# Patient Record
Sex: MALE | Race: BLACK OR AFRICAN AMERICAN | NOT HISPANIC OR LATINO | Employment: FULL TIME | URBAN - METROPOLITAN AREA
[De-identification: names, ages, dates, MRNs, and addresses within clinical notes are randomized per-mention and may not be internally consistent; named-entity substitution may affect disease eponyms.]

---

## 2019-08-27 ENCOUNTER — HOSPITAL ENCOUNTER (OUTPATIENT)
Dept: GENERAL RADIOLOGY | Facility: HOSPITAL | Age: 59
Discharge: HOME | End: 2019-08-27
Admitting: FAMILY MEDICINE

## 2019-08-27 ENCOUNTER — OFFICE VISIT (OUTPATIENT)
Dept: FAMILY MEDICINE CLINIC | Facility: CLINIC | Age: 59
End: 2019-08-27

## 2019-08-27 VITALS
WEIGHT: 211 LBS | SYSTOLIC BLOOD PRESSURE: 122 MMHG | DIASTOLIC BLOOD PRESSURE: 80 MMHG | HEART RATE: 75 BPM | BODY MASS INDEX: 26.24 KG/M2 | OXYGEN SATURATION: 98 % | HEIGHT: 75 IN

## 2019-08-27 DIAGNOSIS — R68.82 LOW LIBIDO: ICD-10-CM

## 2019-08-27 DIAGNOSIS — R33.9 URINARY RETENTION: ICD-10-CM

## 2019-08-27 DIAGNOSIS — N52.9 ERECTILE DYSFUNCTION, UNSPECIFIED ERECTILE DYSFUNCTION TYPE: ICD-10-CM

## 2019-08-27 DIAGNOSIS — M54.50 MIDLINE LOW BACK PAIN WITHOUT SCIATICA, UNSPECIFIED CHRONICITY: ICD-10-CM

## 2019-08-27 DIAGNOSIS — N41.0 ACUTE PROSTATITIS: Primary | ICD-10-CM

## 2019-08-27 PROCEDURE — 99203 OFFICE O/P NEW LOW 30 MIN: CPT | Performed by: FAMILY MEDICINE

## 2019-08-27 PROCEDURE — 72100 X-RAY EXAM L-S SPINE 2/3 VWS: CPT

## 2019-08-28 ENCOUNTER — LAB (OUTPATIENT)
Dept: LAB | Facility: HOSPITAL | Age: 59
End: 2019-08-28

## 2019-08-28 DIAGNOSIS — R68.82 LOW LIBIDO: ICD-10-CM

## 2019-08-28 DIAGNOSIS — R33.9 URINARY RETENTION: ICD-10-CM

## 2019-08-28 DIAGNOSIS — N41.0 ACUTE PROSTATITIS: ICD-10-CM

## 2019-08-28 DIAGNOSIS — N52.9 ERECTILE DYSFUNCTION, UNSPECIFIED ERECTILE DYSFUNCTION TYPE: ICD-10-CM

## 2019-08-28 LAB
ALBUMIN SERPL-MCNC: 4.4 G/DL (ref 3.5–5.2)
ALBUMIN/GLOB SERPL: 1.6 G/DL
ALP SERPL-CCNC: 73 U/L (ref 39–117)
ALT SERPL W P-5'-P-CCNC: 9 U/L (ref 1–41)
ANION GAP SERPL CALCULATED.3IONS-SCNC: 9.9 MMOL/L (ref 5–15)
AST SERPL-CCNC: 14 U/L (ref 1–40)
BASOPHILS # BLD AUTO: 0.05 10*3/MM3 (ref 0–0.2)
BASOPHILS NFR BLD AUTO: 0.9 % (ref 0–1.5)
BILIRUB SERPL-MCNC: 0.6 MG/DL (ref 0.2–1.2)
BILIRUB UR QL STRIP: NEGATIVE
BUN BLD-MCNC: 12 MG/DL (ref 6–20)
BUN/CREAT SERPL: 9.8 (ref 7–25)
CALCIUM SPEC-SCNC: 9.5 MG/DL (ref 8.6–10.5)
CHLORIDE SERPL-SCNC: 102 MMOL/L (ref 98–107)
CHOLEST SERPL-MCNC: 213 MG/DL (ref 0–200)
CLARITY UR: CLEAR
CO2 SERPL-SCNC: 27.1 MMOL/L (ref 22–29)
COLOR UR: YELLOW
CREAT BLD-MCNC: 1.22 MG/DL (ref 0.76–1.27)
DEPRECATED RDW RBC AUTO: 50 FL (ref 37–54)
EOSINOPHIL # BLD AUTO: 0.6 10*3/MM3 (ref 0–0.4)
EOSINOPHIL NFR BLD AUTO: 10.3 % (ref 0.3–6.2)
ERYTHROCYTE [DISTWIDTH] IN BLOOD BY AUTOMATED COUNT: 13.6 % (ref 12.3–15.4)
GFR SERPL CREATININE-BSD FRML MDRD: 74 ML/MIN/1.73
GLOBULIN UR ELPH-MCNC: 2.7 GM/DL
GLUCOSE BLD-MCNC: 103 MG/DL (ref 65–99)
GLUCOSE UR STRIP-MCNC: NEGATIVE MG/DL
HBA1C MFR BLD: 5.98 % (ref 4.8–5.6)
HCT VFR BLD AUTO: 44.5 % (ref 37.5–51)
HDLC SERPL-MCNC: 50 MG/DL (ref 40–60)
HGB BLD-MCNC: 14 G/DL (ref 13–17.7)
HGB UR QL STRIP.AUTO: NEGATIVE
IMM GRANULOCYTES # BLD AUTO: 0.02 10*3/MM3 (ref 0–0.05)
IMM GRANULOCYTES NFR BLD AUTO: 0.3 % (ref 0–0.5)
KETONES UR QL STRIP: NEGATIVE
LDLC SERPL CALC-MCNC: 129 MG/DL (ref 0–100)
LDLC/HDLC SERPL: 2.58 {RATIO}
LEUKOCYTE ESTERASE UR QL STRIP.AUTO: NEGATIVE
LYMPHOCYTES # BLD AUTO: 2.08 10*3/MM3 (ref 0.7–3.1)
LYMPHOCYTES NFR BLD AUTO: 35.8 % (ref 19.6–45.3)
MCH RBC QN AUTO: 31 PG (ref 26.6–33)
MCHC RBC AUTO-ENTMCNC: 31.5 G/DL (ref 31.5–35.7)
MCV RBC AUTO: 98.7 FL (ref 79–97)
MONOCYTES # BLD AUTO: 0.48 10*3/MM3 (ref 0.1–0.9)
MONOCYTES NFR BLD AUTO: 8.3 % (ref 5–12)
NEUTROPHILS # BLD AUTO: 2.58 10*3/MM3 (ref 1.7–7)
NEUTROPHILS NFR BLD AUTO: 44.4 % (ref 42.7–76)
NITRITE UR QL STRIP: NEGATIVE
NRBC BLD AUTO-RTO: 0 /100 WBC (ref 0–0.2)
PH UR STRIP.AUTO: 6.5 [PH] (ref 5–8)
PLATELET # BLD AUTO: 325 10*3/MM3 (ref 140–450)
PMV BLD AUTO: 11.5 FL (ref 6–12)
POTASSIUM BLD-SCNC: 4.7 MMOL/L (ref 3.5–5.2)
PROT SERPL-MCNC: 7.1 G/DL (ref 6–8.5)
PROT UR QL STRIP: NEGATIVE
PSA SERPL-MCNC: 1.89 NG/ML (ref 0–4)
RBC # BLD AUTO: 4.51 10*6/MM3 (ref 4.14–5.8)
SODIUM BLD-SCNC: 139 MMOL/L (ref 136–145)
SP GR UR STRIP: 1.02 (ref 1–1.03)
TESTOST SERPL-MCNC: 523 NG/DL (ref 193–740)
TRIGL SERPL-MCNC: 169 MG/DL (ref 0–150)
TSH SERPL DL<=0.05 MIU/L-ACNC: 2.5 UIU/ML (ref 0.27–4.2)
UROBILINOGEN UR QL STRIP: NORMAL
VLDLC SERPL-MCNC: 33.8 MG/DL (ref 5–40)
WBC NRBC COR # BLD: 5.81 10*3/MM3 (ref 3.4–10.8)

## 2019-08-28 PROCEDURE — 85025 COMPLETE CBC W/AUTO DIFF WBC: CPT

## 2019-08-28 PROCEDURE — 84153 ASSAY OF PSA TOTAL: CPT

## 2019-08-28 PROCEDURE — 81003 URINALYSIS AUTO W/O SCOPE: CPT

## 2019-08-28 PROCEDURE — 84443 ASSAY THYROID STIM HORMONE: CPT

## 2019-08-28 PROCEDURE — 80053 COMPREHEN METABOLIC PANEL: CPT

## 2019-08-28 PROCEDURE — 83036 HEMOGLOBIN GLYCOSYLATED A1C: CPT

## 2019-08-28 PROCEDURE — 84403 ASSAY OF TOTAL TESTOSTERONE: CPT

## 2019-08-28 PROCEDURE — 80061 LIPID PANEL: CPT

## 2019-09-04 ENCOUNTER — TELEPHONE (OUTPATIENT)
Dept: FAMILY MEDICINE CLINIC | Facility: CLINIC | Age: 59
End: 2019-09-04

## 2019-09-04 RX ORDER — SULFAMETHOXAZOLE AND TRIMETHOPRIM 800; 160 MG/1; MG/1
1 TABLET ORAL 2 TIMES DAILY
Qty: 14 TABLET | Refills: 0 | Status: SHIPPED | OUTPATIENT
Start: 2019-09-04 | End: 2019-09-11

## 2019-09-23 ENCOUNTER — OFFICE VISIT (OUTPATIENT)
Dept: FAMILY MEDICINE CLINIC | Facility: CLINIC | Age: 59
End: 2019-09-23

## 2019-09-23 VITALS
HEART RATE: 60 BPM | WEIGHT: 218 LBS | BODY MASS INDEX: 27.1 KG/M2 | DIASTOLIC BLOOD PRESSURE: 80 MMHG | OXYGEN SATURATION: 99 % | HEIGHT: 75 IN | SYSTOLIC BLOOD PRESSURE: 130 MMHG

## 2019-09-23 DIAGNOSIS — R73.03 PREDIABETES: Primary | ICD-10-CM

## 2019-09-23 DIAGNOSIS — Z87.891 PERSONAL HISTORY OF TOBACCO USE, PRESENTING HAZARDS TO HEALTH: ICD-10-CM

## 2019-09-23 DIAGNOSIS — K42.9 UMBILICAL HERNIA WITHOUT OBSTRUCTION AND WITHOUT GANGRENE: ICD-10-CM

## 2019-09-23 DIAGNOSIS — E78.5 HYPERLIPIDEMIA, UNSPECIFIED HYPERLIPIDEMIA TYPE: ICD-10-CM

## 2019-09-23 DIAGNOSIS — R33.9 URINARY RETENTION: ICD-10-CM

## 2019-09-23 PROCEDURE — 99214 OFFICE O/P EST MOD 30 MIN: CPT | Performed by: FAMILY MEDICINE

## 2019-09-23 RX ORDER — BUPROPION HYDROCHLORIDE 150 MG/1
150 TABLET, EXTENDED RELEASE ORAL 2 TIMES DAILY
Qty: 60 TABLET | Refills: 2 | Status: SHIPPED | OUTPATIENT
Start: 2019-09-23 | End: 2021-01-20

## 2019-09-23 RX ORDER — ROSUVASTATIN CALCIUM 5 MG/1
5 TABLET, COATED ORAL DAILY
Qty: 90 TABLET | Refills: 3 | Status: SHIPPED | OUTPATIENT
Start: 2019-09-23 | End: 2021-01-20 | Stop reason: SDUPTHER

## 2019-09-23 RX ORDER — TAMSULOSIN HYDROCHLORIDE 0.4 MG/1
1 CAPSULE ORAL NIGHTLY
Qty: 30 CAPSULE | Refills: 2 | Status: SHIPPED | OUTPATIENT
Start: 2019-09-23 | End: 2021-01-20

## 2019-09-23 NOTE — PROGRESS NOTES
Subjective   Phillip Fulton is a 59 y.o. male.     Chief Complaint   Patient presents with   • Follow-up       History of Present Illness     Phillip Fulton presents today for   Chief Complaint   Patient presents with   • Follow-up     He is here today to follow-up on his prediabetes and hyperlipidemia.  He currently takes no medication for his cholesterol.  He denies any symptoms from these.  He has a history of tobacco use but has been able to quit on his own.  At his last visit he was having some urinary retention and nocturia, has been referred to urology but will not see them for a little while.  He also complains of a bulge around his umbilicus.    This patient is accompanied by their wife who contributes to the history of their care.    The following portions of the patient's history were reviewed and updated as appropriate: allergies, current medications, past family history, past medical history, past social history, past surgical history and problem list.    Active Ambulatory Problems     Diagnosis Date Noted   • Hyperlipidemia 09/23/2019   • Prediabetes 09/23/2019     Resolved Ambulatory Problems     Diagnosis Date Noted   • No Resolved Ambulatory Problems     Past Medical History:   Diagnosis Date   • Hyperlipidemia      Past Surgical History:   Procedure Laterality Date   • COLONOSCOPY       Family History   Problem Relation Age of Onset   • Hypertension Mother    • Cancer Father    • Cancer Maternal Grandmother    • Cancer Paternal Grandmother      Social History     Socioeconomic History   • Marital status: Single     Spouse name: Not on file   • Number of children: Not on file   • Years of education: Not on file   • Highest education level: Not on file   Tobacco Use   • Smoking status: Current Every Day Smoker     Packs/day: 0.25     Types: Cigarettes   • Smokeless tobacco: Never Used   Substance and Sexual Activity   • Alcohol use: Yes     Comment: 4-5 drinks weekly    • Drug use: Yes     Types:  "Cocaine         Review of Systems   Constitutional: Negative.    Respiratory: Negative.    Cardiovascular: Negative.        Objective   Blood pressure 130/80, pulse 60, height 190.5 cm (75\"), weight 98.9 kg (218 lb), SpO2 99 %.  Nursing note reviewed  Physical Exam  Const: NAD, A&Ox4, Pleasant, Cooperative  Eyes: EOMI, no conjunctivitis  ENT: No nasal discharge present, neck supple  Cardiac: Regular rate and rhythm, no cyanosis  Resp: Respiratory rate within normal limits, no increased work of breathing, no audible wheezing or retractions noted  GI: No distention or ascites.  There is a rather large superior umbilical hernia  MSK: Motor and sensation grossly intact in bilateral upper extremities  Neurologic: CN II-XII grossly intact  Psych: Appropriate mood and behavior.  Procedures  Assessment/Plan     Problem List Items Addressed This Visit        Cardiovascular and Mediastinum    Hyperlipidemia    Relevant Medications    rosuvastatin (CRESTOR) 5 MG tablet    Other Relevant Orders    Ambulatory Referral to Nutrition Services       Other    Prediabetes - Primary    Relevant Orders    Ambulatory Referral to Nutrition Services      Other Visit Diagnoses     Urinary retention        Relevant Medications    tamsulosin (FLOMAX) 0.4 MG capsule 24 hr capsule    Personal history of tobacco use, presenting hazards to health        Relevant Medications    buPROPion SR (WELLBUTRIN SR) 150 MG 12 hr tablet    Umbilical hernia without obstruction and without gangrene        Relevant Orders    US Abdomen Limited        #1 HLD  Start rosuvastatin 5 mg daily  Would also like him to see a dietitian    #2 prediabetes  This is a new diagnosis, he will speak to the dietitian and work on his diet    #3 urinary retention  He should see the urologist, but in the meantime I would like him to start on tamsulosin 0.4 mg daily   part of his issue is that the urologist Is in [place] and they have a difficult time with transportation and " getting off of work    #4 umbilical hernia  Recommend an ultrasound of the abdomen    #5 history of tobacco use  Tobacco/Smoking Cessation:  It is very important that he quit smoking. There are various alternatives available to help with this difficult task, but first and foremost, he must make a firm commitment and decision to quit. The nature of nicotine addiction is discussed. The usefulness of behavioral therapy is discussed and suggested. The correct use, cost and side effects of nicotine replacement therapy such as gum or patches is discussed.  The use of vaporizers and there relatively as of yet unknown long-term health effects are discussed.  Bupropion and Chantix and their costs (sometimes not covered fully by insurance) as well as side effects are reviewed. The quit rates are discussed. I recommend he not allow potential costs of treatment to deter him from using nicotine replacement therapy or pharmacologic aids, as the long term economic and health benefits are obvious.    Patient-rated motivation to quit smokin  Patient-rated confidence in ability to quit smokin   Plan: Patient was counseled on the risks of tobacco abuse and benefits of quitting. Patient is interested in quitting at this time. We discussed nicotine replacement versus Bupropion or Chantix. There were given information about [id number]. They were given a prescription for Bupropion. They were advised to avoid secondhand smoke, set a smoking quit date, and given handouts pertinent for tobacco cessation. Over 10 minutes was spent on counseling the patient.  Smoking cessation counseling was provided.  Pharmacotherapy was prescribed as ordered.        Patient Instructions   1.  Engage in aerobic physical activity to lower blood pressure and reduce LDL and non-HDL cholesterol.  I would advise at least 3-4 sessions per week, lasting on average 40 minutes per session, involving moderate to vigorous intensity physical activity.    2.   For the benefit of both blood pressure and LDL lowering, consume a diet emphasizing the intake of vegetables, fruits, and whole grains.  It should include low-fat dairy products, poultry, fish, legumes, nontropical vegetable oils, and nuts.  Limit your intake of sweets, sugar sweetened beverages, and red meats.    3.  Any healthy diet needs to be adapted to appropriate calorie requirements as well as personal preferences.  A diet is only as good as your adherence to it.  Aim for a calorie intake of about 4496-8448 manuel per day.    4.  Call in 2 weeks regarding status of prostate.    5.  Work on quitting smoking. The Zyban (wellbutrin, bupropion) will help with cravings.      Return in about 3 months (around 12/23/2019) for with A1c;.    Ambulatory progress note signed and attested to by Raji Hernandez D.O.

## 2019-09-23 NOTE — PATIENT INSTRUCTIONS
1.  Engage in aerobic physical activity to lower blood pressure and reduce LDL and non-HDL cholesterol.  I would advise at least 3-4 sessions per week, lasting on average 40 minutes per session, involving moderate to vigorous intensity physical activity.    2.  For the benefit of both blood pressure and LDL lowering, consume a diet emphasizing the intake of vegetables, fruits, and whole grains.  It should include low-fat dairy products, poultry, fish, legumes, nontropical vegetable oils, and nuts.  Limit your intake of sweets, sugar sweetened beverages, and red meats.    3.  Any healthy diet needs to be adapted to appropriate calorie requirements as well as personal preferences.  A diet is only as good as your adherence to it.  Aim for a calorie intake of about 2836-7862 manuel per day.    4.  Call in 2 weeks regarding status of prostate.    5.  Work on quitting smoking. The Zyban (wellbutrin, bupropion) will help with cravings.

## 2020-03-23 ENCOUNTER — HOSPITAL ENCOUNTER (EMERGENCY)
Facility: HOSPITAL | Age: 60
Discharge: LEFT BEFORE DISCHARGE | End: 2020-03-23

## 2020-03-23 PROCEDURE — 99211 OFF/OP EST MAY X REQ PHY/QHP: CPT

## 2020-12-10 ENCOUNTER — OFFICE VISIT (OUTPATIENT)
Dept: ORTHOPEDIC SURGERY | Facility: CLINIC | Age: 60
End: 2020-12-10

## 2020-12-10 VITALS — BODY MASS INDEX: 26.26 KG/M2 | OXYGEN SATURATION: 98 % | HEIGHT: 75 IN | WEIGHT: 211.2 LBS | HEART RATE: 74 BPM

## 2020-12-10 DIAGNOSIS — M25.562 LEFT ANTERIOR KNEE PAIN: ICD-10-CM

## 2020-12-10 DIAGNOSIS — M25.562 LEFT KNEE PAIN, UNSPECIFIED CHRONICITY: Primary | ICD-10-CM

## 2020-12-10 PROCEDURE — 99204 OFFICE O/P NEW MOD 45 MIN: CPT | Performed by: PHYSICIAN ASSISTANT

## 2020-12-10 RX ORDER — MELOXICAM 15 MG/1
15 TABLET ORAL DAILY
Qty: 30 TABLET | Refills: 2 | Status: SHIPPED | OUTPATIENT
Start: 2020-12-10 | End: 2021-01-20 | Stop reason: SDUPTHER

## 2020-12-10 NOTE — PROGRESS NOTES
[facility] Note    Subjective     Patient: Phillip Fulton  : 1960    Primary Care Provider: Raji Hernandez DO    Requesting Provider: As above    Pain of the Left Knee      History    Chief Complaint: Left knee pain    History of Present Illness: This is a very pleasant 60-year-old male presenting to discuss his 2-month history of left knee pain.  He denies any trauma or injury.  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  He complains of pain with standing and weightbearing and walking.  It is worse when getting up from a seated position and stairs.  He rates it to be 7/10 and aching with stiffness.  It is all anterior based pain.  He denies any mechanical symptoms or radiating pain.  He has had no prior treatment.  He is here for further evaluation and treatment recommendations.    Current Outpatient Medications on File Prior to Visit   Medication Sig Dispense Refill   • buPROPion SR (WELLBUTRIN SR) 150 MG 12 hr tablet Take 1 tablet by mouth 2 (Two) Times a Day. 60 tablet 2   • rosuvastatin (CRESTOR) 5 MG tablet Take 1 tablet by mouth Daily. 90 tablet 3   • tamsulosin (FLOMAX) 0.4 MG capsule 24 hr capsule Take 1 capsule by mouth Every Night. 30 capsule 2     No current facility-administered medications on file prior to visit.       No Known Allergies   Past Medical History:   Diagnosis Date   • Hyperlipidemia      Past Surgical History:   Procedure Laterality Date   • COLONOSCOPY       Family History   Problem Relation Age of Onset   • Hypertension Mother    • Cancer Father    • Cancer Maternal Grandmother    • Cancer Paternal Grandmother       Social History     Socioeconomic History   • Marital status: Single     Spouse name: Not on file   • Number of children: Not on file   • Years of education: Not on file   • Highest education level: Not on file   Tobacco Use   • Smoking status: Current Every Day Smoker     Packs/day: 0.25     Types: Cigarettes   • Smokeless  "tobacco: Never Used   Substance and Sexual Activity   • Alcohol use: Yes     Comment: 4-5 drinks weekly    • Drug use: Yes     Types: Cocaine(coke)   • Sexual activity: Defer        Review of Systems   Constitutional: Negative.    HENT: Negative.    Eyes: Negative.    Respiratory: Negative.    Cardiovascular: Negative.    Gastrointestinal: Negative.    Endocrine: Negative.    Genitourinary: Negative.    Musculoskeletal: Positive for arthralgias.   Skin: Negative.    Allergic/Immunologic: Negative.    Neurological: Negative.    Hematological: Negative.    Psychiatric/Behavioral: Negative.        The following portions of the patient's history were reviewed and updated as appropriate: allergies, current medications, past family history, past medical history, past social history, past surgical history and problem list.      Objective      Physical Exam  Pulse 74   Ht 190 cm (74.8\")   Wt 95.8 kg (211 lb 3.2 oz)   SpO2 98%   BMI 26.54 kg/m²     Body mass index is 26.54 kg/m².    GENERAL: Body habitus: normal weight for height    Lower extremity edema: Left: none; Right: none    Varicose veins:  Left: mild; Right: mild    Gait: normal     Mental Status:  awake and alert; oriented to person, place, and time    Voice:  clear  SKIN:  Normal    Hair Growth:  Right:normal; Left:  normal  HEENT: Head: Normocephalic, atraumatic,  without obvious abnormality.  eye: normal external eye, no icterus   PULM:  Repiratory effort normal    Ortho Exam  Peripheral Vascular:    Upper Extremity:   Inspection:  Left--no cyanotic nail beds Right--no cyanotic nail beds   Bilateral:  Pink nail beds with brisk capillary refill   Palpation:  Bilateral radial pulse normal    Musculoskeletal:  Global Assessment:  Overall assessment of Lower Extremity Muscle Strength and Tone:  Left quadriceps--5/5   Left hamstrings--5/5       Left tibialis anterior--5/5  Left gastroc-soleus--5/5  Left EHL--5/5    Right quadriceps--5/5  Right " hamstrings--5/5  Right tibialis anterior--5/5  Right gastroc soleus--5/5  Right EHL--5/5    Lower Extremity:  Knee/Patella:  No digital clubbing or cyanosis.    Examination of left and right knees reveals:  Normal deep tendon reflexes, coordination, strength, tone, sensation.  No known fractures or deformities.    Inspection and Palpation:    Left knee:  Tenderness: Patellofemoral tenderness  Effusion: None  Crepitus:  positive  Pulses:  2+  Ecchymosis:  None  Warmth:  None     Right knee:  Tenderness: None  Effusion: None  Crepitus: Negative   pulses:  2+  Ecchymosis:  None  Warmth:  None     ROM:  Right:  Extension:0    Flexion:135  Left:  Extension:0     Flexion:135    Instability:    Left:  Lachman Test:  Negative, Varus stress test negative, Valgus stress test negative   Anterior Drawer Test:  Negative, Posterior Drawer Test:  Negative    Right:  Lachman Test:  Negative, Varus stress test negative, Valgus stress test negative   Anterior Drawer Test:  Negative, Posterior Drawer Test:  Negative    Deformities/Malalignments/Discrepancies:    Left:  none  Right:  none    Functional Testing:  Right:  Octavio's test:  Negative  Patella grind test:  Negative  Q-angle:  Normal  Apprehension Sign:  Negative      Left:  Octavio's test:  Negative  Patella grind test:  Negative  Q-angle:  Normal  Apprehension Sign:  Negative    SENSATION TO LIGHT TOUCH:  DEEP PERONEAL/SUPERFICIAL PERONEAL/SURAL/SAPHENOUS/TIBIAL:   Left intact; Right intact          Medical Decision Making    Data Review:   ordered and reviewed x-rays today    Assessment:  1. Left knee pain, unspecified chronicity    2. Left anterior knee pain        Plan:  Left anterior knee pain.  I reviewed today's x-rays and clinical findings with the patient.  He is tender over the patellofemoral joint line with mild crepitus.  X-rays today show mild to early moderate medial compartment arthritis with mild patellofemoral degenerative changes.  There are no acute bony  findings.  I think his pain and functional rotations are coming from the patellofemoral joint.  Recommendation today is a round of physical therapy as well as a regular scheduled anti-inflammatory.  I given him a prescription for Mobic.  He will return to see me in 6 to 8 weeks or sooner if needed.    History, diagnosis and treatment plan discussed with Dr. Carlton.        Jovanna Combs PA-C  12/10/20  13:31 EST

## 2021-01-20 ENCOUNTER — OFFICE VISIT (OUTPATIENT)
Dept: FAMILY MEDICINE CLINIC | Facility: CLINIC | Age: 61
End: 2021-01-20

## 2021-01-20 VITALS
OXYGEN SATURATION: 97 % | DIASTOLIC BLOOD PRESSURE: 84 MMHG | BODY MASS INDEX: 26.31 KG/M2 | HEIGHT: 75 IN | SYSTOLIC BLOOD PRESSURE: 130 MMHG | HEART RATE: 67 BPM | WEIGHT: 211.6 LBS

## 2021-01-20 DIAGNOSIS — R20.0 NUMBNESS OF LEFT HAND: ICD-10-CM

## 2021-01-20 DIAGNOSIS — Z87.891 PERSONAL HISTORY OF TOBACCO USE, PRESENTING HAZARDS TO HEALTH: ICD-10-CM

## 2021-01-20 DIAGNOSIS — M79.602 LEFT ARM PAIN: Primary | ICD-10-CM

## 2021-01-20 DIAGNOSIS — N52.9 ERECTILE DYSFUNCTION, UNSPECIFIED ERECTILE DYSFUNCTION TYPE: ICD-10-CM

## 2021-01-20 DIAGNOSIS — R73.03 PREDIABETES: ICD-10-CM

## 2021-01-20 DIAGNOSIS — E78.5 HYPERLIPIDEMIA, UNSPECIFIED HYPERLIPIDEMIA TYPE: ICD-10-CM

## 2021-01-20 LAB — HBA1C MFR BLD: 5.7 %

## 2021-01-20 PROCEDURE — 99214 OFFICE O/P EST MOD 30 MIN: CPT | Performed by: FAMILY MEDICINE

## 2021-01-20 PROCEDURE — 83036 HEMOGLOBIN GLYCOSYLATED A1C: CPT | Performed by: FAMILY MEDICINE

## 2021-01-20 PROCEDURE — 93000 ELECTROCARDIOGRAM COMPLETE: CPT | Performed by: FAMILY MEDICINE

## 2021-01-20 RX ORDER — MELOXICAM 15 MG/1
15 TABLET ORAL DAILY
Qty: 30 TABLET | Refills: 2 | Status: SHIPPED | OUTPATIENT
Start: 2021-01-20 | End: 2021-12-09 | Stop reason: SDUPTHER

## 2021-01-20 RX ORDER — SILDENAFIL 100 MG/1
100 TABLET, FILM COATED ORAL DAILY PRN
Qty: 15 TABLET | Refills: 0 | Status: SHIPPED | OUTPATIENT
Start: 2021-01-20 | End: 2021-03-15 | Stop reason: SDUPTHER

## 2021-01-20 RX ORDER — ROSUVASTATIN CALCIUM 5 MG/1
5 TABLET, COATED ORAL DAILY
Qty: 90 TABLET | Refills: 3 | Status: SHIPPED | OUTPATIENT
Start: 2021-01-20 | End: 2021-12-08 | Stop reason: SDUPTHER

## 2021-01-20 NOTE — PROGRESS NOTES
Subjective   Phillip Fulton is a 60 y.o. male.     Chief Complaint   Patient presents with   • Numbness     pt states that for 2 days his left thumb and index finger are numb and his left arm up to his shoulders hurt.        History of Present Illness     Phillip Fulton presents today for   Chief Complaint   Patient presents with   • Numbness     pt states that for 2 days his left thumb and index finger are numb and his left arm up to his shoulders hurt.      Smoking one black & mild per day.  Stopped taking all his medications including his statin.  He also does not take the tamsulosin regularly.  Left arm pain/numbness in thumb and forefinger started suddenly 2 days ago.  He was at work when it began.  It has continued without any change over the last 2 days.  Nothing aggravates or relieves it.  He tried taking ibuprofen without much improvement.    He has had erectile dysfunction for years, along with slightly decreased libido.  Testosterone check last year was normal.  He has mild prediabetes with an A1c hovering 5.7%.  Otherwise lab work-up has been previously normal.    This patient is accompanied by their self who contributes to the history of their care.    The following portions of the patient's history were reviewed and updated as appropriate: allergies, current medications, past family history, past medical history, past social history, past surgical history and problem list.    Active Ambulatory Problems     Diagnosis Date Noted   • Hyperlipidemia 09/23/2019   • Prediabetes 09/23/2019     Resolved Ambulatory Problems     Diagnosis Date Noted   • No Resolved Ambulatory Problems     No Additional Past Medical History     Past Surgical History:   Procedure Laterality Date   • COLONOSCOPY       Family History   Problem Relation Age of Onset   • Hypertension Mother    • Cancer Father    • Cancer Maternal Grandmother    • Cancer Paternal Grandmother      Social History     Socioeconomic History   • Marital  "status: Single     Spouse name: Not on file   • Number of children: Not on file   • Years of education: Not on file   • Highest education level: Not on file   Tobacco Use   • Smoking status: Current Every Day Smoker     Packs/day: 0.25     Years: 15.00     Pack years: 3.75     Types: Cigars   • Smokeless tobacco: Never Used   Substance and Sexual Activity   • Alcohol use: Yes     Comment: 4-5 drinks weekly    • Drug use: Yes     Types: Cocaine(coke)   • Sexual activity: Defer       Review of Systems  Review of Systems -  General ROS: negative for - chills, fever or night sweats  Cardiovascular ROS: no chest pain or dyspnea on exertion  Gastrointestinal ROS: no abdominal pain, change in bowel habits, or black or bloody stools  Genito-Urinary ROS: no dysuria, trouble voiding, or hematuria    Objective   Blood pressure 130/84, pulse 67, height 190 cm (74.8\"), weight 96 kg (211 lb 9.6 oz), SpO2 97 %.  Nursing note reviewed  Physical Exam  Const: NAD, A&Ox4, Pleasant, Cooperative  Eyes: EOMI, no conjunctivitis  ENT: No nasal discharge present, neck supple  Cardiac: Regular rate and rhythm, no cyanosis  Resp: Respiratory rate within normal limits, no increased work of breathing, no audible wheezing or retractions noted  GI: No distention or ascites  MSK: Slight tenderness over the left posterior scalene and trapezius, pain elicited with right rotation of the cervical spine.  Spurling negative.  Tinel negative at the median and ulnar nerve.    ECG 12 Lead    Date/Time: 1/20/2021 9:49 AM  Performed by: Raji Hernandez DO  Authorized by: Raji Hernandez DO   Comparison: not compared with previous ECG   Previous ECG: no previous ECG available  Rhythm: sinus bradycardia  Rate: bradycardic  Conduction: conduction normal  QRS axis: normal    Clinical impression: normal ECG          Assessment/Plan   Problem List Items Addressed This Visit        Cardiac and Vasculature    Hyperlipidemia    Overview     He is prescribed " rosuvastatin 5 mg daily, but does not take this regularly         Relevant Medications    rosuvastatin (CRESTOR) 5 MG tablet    Other Relevant Orders    Lipid Panel    Comprehensive Metabolic Panel       Endocrine and Metabolic    Prediabetes    Overview     A1c today 5.7%, diet controlled         Relevant Orders    POC Glycosylated Hemoglobin (Hb A1C) (Completed)      Other Visit Diagnoses     Left arm pain    -  Primary    x2 days, stable    Relevant Medications    meloxicam (MOBIC) 15 MG tablet    Other Relevant Orders    ECG 12 Lead    XR Spine Cervical 2 or 3 View    Numbness of left hand        Thumb and forefinger x2 days    Relevant Medications    meloxicam (MOBIC) 15 MG tablet    Other Relevant Orders    ECG 12 Lead    XR Spine Cervical 2 or 3 View    Erectile dysfunction, unspecified erectile dysfunction type        Relevant Medications    sildenafil (Viagra) 100 MG tablet    Other Relevant Orders    Testosterone    Prolactin    TSH Rfx On Abnormal To Free T4    Urinalysis With Microscopic If Indicated (No Culture) - Urine, Clean Catch    Lipid Panel    Comprehensive Metabolic Panel    CBC & Differential    Vitamin D 25 Hydroxy    Vitamin B12    PSA DIAGNOSTIC    Ambulatory Referral to Urology    Personal history of tobacco use, presenting hazards to health              See patient diagnoses and orders along with patient instructions for assessment, plan, and changes to care for patient.    Patient Instructions   1.  For the neck pain/headaches, try some chin-tuck exercises to relax the muscles in your neck that contribute to tension headaches: Sitting up against a wall, looking straight forward and keeping your chin parallel to the floor, slide your head back until the bump on the back touches the wall. Hold for 5 seconds, repeat 3 times, and do a couple sets per day.     I also recommend Tiger Balm (over the counter) applied once daily and as needed, massaged into the muscles of your neck and at the base  of your skull. If those don't help let me know, other options include trigger point injections, acupuncture, physical therapy, and imaging if we need it.    2.  Take meloxicam as prescribed daily    3.  Use Viagra as needed      Return in about 2 weeks (around 2/3/2021) for phone visit, follow up to discuss lab results.    Ambulatory progress note signed and attested to by Raji Hernandez D.O.

## 2021-01-20 NOTE — PATIENT INSTRUCTIONS
1.  For the neck pain/headaches, try some chin-tuck exercises to relax the muscles in your neck that contribute to tension headaches: Sitting up against a wall, looking straight forward and keeping your chin parallel to the floor, slide your head back until the bump on the back touches the wall. Hold for 5 seconds, repeat 3 times, and do a couple sets per day.     I also recommend Tiger Balm (over the counter) applied once daily and as needed, massaged into the muscles of your neck and at the base of your skull. If those don't help let me know, other options include trigger point injections, acupuncture, physical therapy, and imaging if we need it.    2.  Take meloxicam as prescribed daily    3.  Use Viagra as needed

## 2021-01-21 ENCOUNTER — LAB (OUTPATIENT)
Dept: LAB | Facility: HOSPITAL | Age: 61
End: 2021-01-21

## 2021-01-21 ENCOUNTER — HOSPITAL ENCOUNTER (OUTPATIENT)
Dept: GENERAL RADIOLOGY | Facility: HOSPITAL | Age: 61
Discharge: HOME | End: 2021-01-21

## 2021-01-21 DIAGNOSIS — M79.602 LEFT ARM PAIN: ICD-10-CM

## 2021-01-21 DIAGNOSIS — N52.9 ERECTILE DYSFUNCTION, UNSPECIFIED ERECTILE DYSFUNCTION TYPE: ICD-10-CM

## 2021-01-21 DIAGNOSIS — R20.0 NUMBNESS OF LEFT HAND: ICD-10-CM

## 2021-01-21 DIAGNOSIS — E78.5 HYPERLIPIDEMIA, UNSPECIFIED HYPERLIPIDEMIA TYPE: ICD-10-CM

## 2021-01-21 PROCEDURE — 72040 X-RAY EXAM NECK SPINE 2-3 VW: CPT

## 2021-02-03 ENCOUNTER — LAB (OUTPATIENT)
Dept: LAB | Facility: HOSPITAL | Age: 61
End: 2021-02-03

## 2021-02-03 DIAGNOSIS — N52.9 ERECTILE DYSFUNCTION, UNSPECIFIED ERECTILE DYSFUNCTION TYPE: ICD-10-CM

## 2021-02-03 LAB
25(OH)D3 SERPL-MCNC: 7.9 NG/ML (ref 30–100)
ALBUMIN SERPL-MCNC: 4.1 G/DL (ref 3.5–5.2)
ALBUMIN/GLOB SERPL: 1.4 G/DL
ALP SERPL-CCNC: 77 U/L (ref 39–117)
ALT SERPL W P-5'-P-CCNC: 17 U/L (ref 1–41)
ANION GAP SERPL CALCULATED.3IONS-SCNC: 10.2 MMOL/L (ref 5–15)
AST SERPL-CCNC: 20 U/L (ref 1–40)
BASOPHILS # BLD AUTO: 0.07 10*3/MM3 (ref 0–0.2)
BASOPHILS NFR BLD AUTO: 0.9 % (ref 0–1.5)
BILIRUB SERPL-MCNC: 0.4 MG/DL (ref 0–1.2)
BILIRUB UR QL STRIP: NEGATIVE
BUN SERPL-MCNC: 14 MG/DL (ref 6–20)
BUN/CREAT SERPL: 12.3 (ref 7–25)
CALCIUM SPEC-SCNC: 9.7 MG/DL (ref 8.6–10.5)
CHLORIDE SERPL-SCNC: 103 MMOL/L (ref 98–107)
CHOLEST SERPL-MCNC: 198 MG/DL (ref 0–200)
CLARITY UR: CLEAR
CO2 SERPL-SCNC: 27.8 MMOL/L (ref 22–29)
COLOR UR: YELLOW
CREAT SERPL-MCNC: 1.14 MG/DL (ref 0.76–1.27)
DEPRECATED RDW RBC AUTO: 44.9 FL (ref 37–54)
EOSINOPHIL # BLD AUTO: 0.9 10*3/MM3 (ref 0–0.4)
EOSINOPHIL NFR BLD AUTO: 11.2 % (ref 0.3–6.2)
ERYTHROCYTE [DISTWIDTH] IN BLOOD BY AUTOMATED COUNT: 13.2 % (ref 12.3–15.4)
GFR SERPL CREATININE-BSD FRML MDRD: 80 ML/MIN/1.73
GLOBULIN UR ELPH-MCNC: 2.9 GM/DL
GLUCOSE SERPL-MCNC: 97 MG/DL (ref 65–99)
GLUCOSE UR STRIP-MCNC: NEGATIVE MG/DL
HCT VFR BLD AUTO: 42.5 % (ref 37.5–51)
HDLC SERPL-MCNC: 52 MG/DL (ref 40–60)
HGB BLD-MCNC: 14.5 G/DL (ref 13–17.7)
HGB UR QL STRIP.AUTO: NEGATIVE
IMM GRANULOCYTES # BLD AUTO: 0.03 10*3/MM3 (ref 0–0.05)
IMM GRANULOCYTES NFR BLD AUTO: 0.4 % (ref 0–0.5)
KETONES UR QL STRIP: NEGATIVE
LDLC SERPL CALC-MCNC: 126 MG/DL (ref 0–100)
LDLC/HDLC SERPL: 2.38 {RATIO}
LEUKOCYTE ESTERASE UR QL STRIP.AUTO: NEGATIVE
LYMPHOCYTES # BLD AUTO: 2.53 10*3/MM3 (ref 0.7–3.1)
LYMPHOCYTES NFR BLD AUTO: 31.5 % (ref 19.6–45.3)
MCH RBC QN AUTO: 31.6 PG (ref 26.6–33)
MCHC RBC AUTO-ENTMCNC: 34.1 G/DL (ref 31.5–35.7)
MCV RBC AUTO: 92.6 FL (ref 79–97)
MONOCYTES # BLD AUTO: 0.65 10*3/MM3 (ref 0.1–0.9)
MONOCYTES NFR BLD AUTO: 8.1 % (ref 5–12)
NEUTROPHILS NFR BLD AUTO: 3.86 10*3/MM3 (ref 1.7–7)
NEUTROPHILS NFR BLD AUTO: 47.9 % (ref 42.7–76)
NITRITE UR QL STRIP: NEGATIVE
NRBC BLD AUTO-RTO: 0 /100 WBC (ref 0–0.2)
PH UR STRIP.AUTO: 6.5 [PH] (ref 5–8)
PLATELET # BLD AUTO: 310 10*3/MM3 (ref 140–450)
PMV BLD AUTO: 11.2 FL (ref 6–12)
POTASSIUM SERPL-SCNC: 4.1 MMOL/L (ref 3.5–5.2)
PROLACTIN SERPL-MCNC: 23.6 NG/ML (ref 4.04–15.2)
PROT SERPL-MCNC: 7 G/DL (ref 6–8.5)
PROT UR QL STRIP: NEGATIVE
PSA SERPL-MCNC: 3.61 NG/ML (ref 0–4)
RBC # BLD AUTO: 4.59 10*6/MM3 (ref 4.14–5.8)
SODIUM SERPL-SCNC: 141 MMOL/L (ref 136–145)
SP GR UR STRIP: 1.02 (ref 1–1.03)
T4 FREE SERPL-MCNC: 1.43 NG/DL (ref 0.93–1.7)
TESTOST SERPL-MCNC: 374 NG/DL (ref 193–740)
TRIGL SERPL-MCNC: 110 MG/DL (ref 0–150)
TSH SERPL DL<=0.05 MIU/L-ACNC: 5.55 UIU/ML (ref 0.27–4.2)
UROBILINOGEN UR QL STRIP: NORMAL
VIT B12 BLD-MCNC: 411 PG/ML (ref 211–946)
VLDLC SERPL-MCNC: 20 MG/DL (ref 5–40)
WBC # BLD AUTO: 8.04 10*3/MM3 (ref 3.4–10.8)

## 2021-02-03 PROCEDURE — 84403 ASSAY OF TOTAL TESTOSTERONE: CPT

## 2021-02-03 PROCEDURE — 80061 LIPID PANEL: CPT

## 2021-02-03 PROCEDURE — 80053 COMPREHEN METABOLIC PANEL: CPT

## 2021-02-03 PROCEDURE — 82306 VITAMIN D 25 HYDROXY: CPT

## 2021-02-03 PROCEDURE — 82607 VITAMIN B-12: CPT

## 2021-02-03 PROCEDURE — 84153 ASSAY OF PSA TOTAL: CPT

## 2021-02-03 PROCEDURE — 84146 ASSAY OF PROLACTIN: CPT

## 2021-02-03 PROCEDURE — 84443 ASSAY THYROID STIM HORMONE: CPT

## 2021-02-03 PROCEDURE — 85025 COMPLETE CBC W/AUTO DIFF WBC: CPT

## 2021-02-03 PROCEDURE — 81003 URINALYSIS AUTO W/O SCOPE: CPT

## 2021-02-03 PROCEDURE — 84439 ASSAY OF FREE THYROXINE: CPT

## 2021-02-04 ENCOUNTER — OFFICE VISIT (OUTPATIENT)
Dept: FAMILY MEDICINE CLINIC | Facility: CLINIC | Age: 61
End: 2021-02-04

## 2021-02-04 DIAGNOSIS — R79.89 ELEVATED PROLACTIN LEVEL: ICD-10-CM

## 2021-02-04 DIAGNOSIS — R20.0 NUMBNESS OF LEFT HAND: ICD-10-CM

## 2021-02-04 DIAGNOSIS — M79.602 LEFT ARM PAIN: Primary | ICD-10-CM

## 2021-02-04 PROCEDURE — 99442 PR PHYS/QHP TELEPHONE EVALUATION 11-20 MIN: CPT | Performed by: FAMILY MEDICINE

## 2021-02-19 NOTE — PROGRESS NOTES
Subjective   Phillip Fulton is a 61 y.o. male.     Chief Complaint   Patient presents with   • Arm Pain     pt states he is still in his left arm he saif the meds arent helping at all. Pt states he went and got labs done yesterday as well and wants to know them results if possible.        History of Present Illness     Phillip Fulton presents today for   Chief Complaint   Patient presents with   • Arm Pain     pt states he is still in his left arm he saif the meds arent helping at all. Pt states he went and got labs done yesterday as well and wants to know them results if possible.        He had labs completed. Still having arm pain. Labs showed a slightly elevated prolactin level with low vitamin D, mildly elevated TSH with nrmal free T4.    You have chosen to receive care through a telephone visit. Do you consent to use a telephone visit for your medical care today? Yes     The following portions of the patient's history were reviewed and updated as appropriate: allergies, current medications, past family history, past medical history, past social history, past surgical history and problem list.    Active Ambulatory Problems     Diagnosis Date Noted   • Hyperlipidemia 09/23/2019   • Prediabetes 09/23/2019     Resolved Ambulatory Problems     Diagnosis Date Noted   • No Resolved Ambulatory Problems     No Additional Past Medical History     Past Surgical History:   Procedure Laterality Date   • COLONOSCOPY       Family History   Problem Relation Age of Onset   • Hypertension Mother    • Cancer Father    • Cancer Maternal Grandmother    • Cancer Paternal Grandmother      Social History     Socioeconomic History   • Marital status: Single     Spouse name: Not on file   • Number of children: Not on file   • Years of education: Not on file   • Highest education level: Not on file   Tobacco Use   • Smoking status: Current Every Day Smoker     Packs/day: 0.25     Years: 15.00     Pack years: 3.75     Types: Cigars    • Smokeless tobacco: Never Used   Substance and Sexual Activity   • Alcohol use: Yes     Comment: 4-5 drinks weekly    • Drug use: Yes     Types: Cocaine(coke)   • Sexual activity: Defer       Review of Systems  Review of Systems -  General ROS: negative for - chills, fever or night sweats  Cardiovascular ROS: no chest pain or dyspnea on exertion  Gastrointestinal ROS: no abdominal pain, change in bowel habits, or black or bloody stools  Genito-Urinary ROS: no dysuria, trouble voiding, or hematuria    Objective   There were no vitals taken for this visit.  Vitals obtained from patient if available  Physical Exam  Const: No indications of acute distress  Cardiac: Regular rate by pulse  Resp: Respiratory rate observed to be within normal limits, no increased work of breathing observed, no audible wheezing or cough noted  Psych: Appropriate mood and behavior. Speech is normal.  Procedures  Assessment/Plan   Problem List Items Addressed This Visit     None      Visit Diagnoses     Left arm pain    -  Primary    Relevant Orders    MRI Brain Without Contrast    MRI Cervical Spine Without Contrast    Numbness of left hand        Relevant Orders    MRI Brain Without Contrast    MRI Cervical Spine Without Contrast    Elevated prolactin level        Relevant Orders    MRI Brain Without Contrast        Recommend MRI of brain and c-spine given paresthesias and elevated PRL.    See patient diagnoses and orders along with patient instructions for assessment, plan, and changes to care for patient.    This visit was conducted via telemedicine with live audio provided by telephone at the point of care. Phone Visit Duration: 15 minutes was spent on the phone with patient coordinating care, discussing his case and current plan, and engaging in shared medical decision making.    There are no Patient Instructions on file for this visit.    No follow-ups on file.    Ambulatory progress note signed and attested to by Raji Hernandez  LESLYE.

## 2021-03-01 ENCOUNTER — APPOINTMENT (OUTPATIENT)
Dept: MRI IMAGING | Facility: HOSPITAL | Age: 61
End: 2021-03-01

## 2021-03-01 ENCOUNTER — HOSPITAL ENCOUNTER (OUTPATIENT)
Dept: MRI IMAGING | Facility: HOSPITAL | Age: 61
Discharge: HOME | End: 2021-03-01
Admitting: FAMILY MEDICINE

## 2021-03-01 DIAGNOSIS — M79.602 LEFT ARM PAIN: ICD-10-CM

## 2021-03-01 DIAGNOSIS — R20.0 NUMBNESS OF LEFT HAND: ICD-10-CM

## 2021-03-01 PROCEDURE — 72141 MRI NECK SPINE W/O DYE: CPT

## 2021-03-15 ENCOUNTER — TELEPHONE (OUTPATIENT)
Dept: FAMILY MEDICINE CLINIC | Facility: CLINIC | Age: 61
End: 2021-03-15

## 2021-03-15 DIAGNOSIS — N52.9 ERECTILE DYSFUNCTION, UNSPECIFIED ERECTILE DYSFUNCTION TYPE: ICD-10-CM

## 2021-03-15 NOTE — TELEPHONE ENCOUNTER
Caller: Phillip Fulton    Relationship: Self    Best call back number: [phone]    Medication needed:   Requested Prescriptions     Pending Prescriptions Disp Refills   • sildenafil (Viagra) 100 MG tablet 15 tablet 0     Sig: Take 1 tablet by mouth Daily As Needed for Erectile Dysfunction.       When do you need the refill by: SOON    What details did the patient provide when requesting the medication: PATIENT HAS ABOUT 3 DOSES OF MEDICATION REMAINING.    Does the patient have less than a 3 day supply:  [] Yes  [x] No    What is the patient's preferred pharmacy: [facility]

## 2021-03-16 RX ORDER — SILDENAFIL 100 MG/1
100 TABLET, FILM COATED ORAL DAILY PRN
Qty: 15 TABLET | Refills: 0 | Status: SHIPPED | OUTPATIENT
Start: 2021-03-16 | End: 2021-05-11 | Stop reason: SDUPTHER

## 2021-05-11 DIAGNOSIS — N52.9 ERECTILE DYSFUNCTION, UNSPECIFIED ERECTILE DYSFUNCTION TYPE: ICD-10-CM

## 2021-05-11 RX ORDER — SILDENAFIL 100 MG/1
100 TABLET, FILM COATED ORAL DAILY PRN
Qty: 15 TABLET | Refills: 0 | Status: SHIPPED | OUTPATIENT
Start: 2021-05-11 | End: 2021-06-22 | Stop reason: SDUPTHER

## 2021-05-11 NOTE — TELEPHONE ENCOUNTER
Caller: Phillip Fulton    Relationship: Self    Best call back number: [phone]    Medication needed:   Requested Prescriptions     Pending Prescriptions Disp Refills   • sildenafil (Viagra) 100 MG tablet 15 tablet 0     Sig: Take 1 tablet by mouth Daily As Needed for Erectile Dysfunction.       When do you need the refill by: TODAY    What additional details did the patient provide when requesting the medication: PATIENT IS COMPLETELY OUT OF MEDICATION.    Does the patient have less than a 3 day supply:  [x] Yes  [] No    What is the patient's preferred pharmacy: [facility]

## 2021-06-22 DIAGNOSIS — N52.9 ERECTILE DYSFUNCTION, UNSPECIFIED ERECTILE DYSFUNCTION TYPE: ICD-10-CM

## 2021-06-22 RX ORDER — SILDENAFIL 100 MG/1
100 TABLET, FILM COATED ORAL DAILY PRN
Qty: 15 TABLET | Refills: 0 | Status: SHIPPED | OUTPATIENT
Start: 2021-06-22 | End: 2021-08-25 | Stop reason: SDUPTHER

## 2021-06-22 NOTE — TELEPHONE ENCOUNTER
Caller: Phillip Fulton    Relationship: Self    Best call back number: [phone]    Medication needed:   Requested Prescriptions     Pending Prescriptions Disp Refills   • sildenafil (Viagra) 100 MG tablet 15 tablet 0     Sig: Take 1 tablet by mouth Daily As Needed for Erectile Dysfunction.       When do you need the refill by: 06/22/2021    What additional details did the patient provide when requesting the medication: PATIENT IS OUT OF MEDICATION.     Does the patient have less than a 3 day supply:  [x] Yes  [] No    What is the patient's preferred pharmacy: [facility]

## 2021-08-25 DIAGNOSIS — N52.9 ERECTILE DYSFUNCTION, UNSPECIFIED ERECTILE DYSFUNCTION TYPE: ICD-10-CM

## 2021-08-25 RX ORDER — SILDENAFIL 100 MG/1
100 TABLET, FILM COATED ORAL DAILY PRN
Qty: 15 TABLET | Refills: 0 | Status: SHIPPED | OUTPATIENT
Start: 2021-08-25 | End: 2021-10-06 | Stop reason: SDUPTHER

## 2021-08-25 NOTE — TELEPHONE ENCOUNTER
Rx Refill Note  Requested Prescriptions     Pending Prescriptions Disp Refills   • sildenafil (Viagra) 100 MG tablet 15 tablet 0     Sig: Take 1 tablet by mouth Daily As Needed for Erectile Dysfunction.      Last office visit with prescribing clinician: 2/4/2021      Next office visit with prescribing clinician: none            Vijaya Hutchins MA  08/25/21, 15:58 EDT

## 2021-10-06 DIAGNOSIS — N52.9 ERECTILE DYSFUNCTION, UNSPECIFIED ERECTILE DYSFUNCTION TYPE: ICD-10-CM

## 2021-10-06 NOTE — TELEPHONE ENCOUNTER
:Medication requested (name and dose): Sildenafil 100 mg    Pharmacy where request should be sent: [facility] (on file).    Additional details provided by patient: patient is out of medication and would like a refill.    Best call back number: [phone]    Does the patient have less than a 3 day supply:  [] Yes  [x] No    Thank you.    SARIAH MABRY, [facility] Rep  10/06/21, 12:44 EDT    {

## 2021-10-06 NOTE — TELEPHONE ENCOUNTER
Rx Refill Note  Requested Prescriptions     Pending Prescriptions Disp Refills   • sildenafil (Viagra) 100 MG tablet 15 tablet 0     Sig: Take 1 tablet by mouth Daily As Needed for Erectile Dysfunction.      Last office visit with prescribing clinician: 2/4/2021      Next office visit with prescribing clinician: Visit date not found   3}  Yesika Martínez MA  10/06/21, 13:06 EDT     Last fill: 08/25/2021

## 2021-10-07 RX ORDER — SILDENAFIL 100 MG/1
100 TABLET, FILM COATED ORAL DAILY PRN
Qty: 15 TABLET | Refills: 0 | Status: SHIPPED | OUTPATIENT
Start: 2021-10-07 | End: 2021-11-08 | Stop reason: SDUPTHER

## 2021-11-08 DIAGNOSIS — N52.9 ERECTILE DYSFUNCTION, UNSPECIFIED ERECTILE DYSFUNCTION TYPE: ICD-10-CM

## 2021-11-08 RX ORDER — SILDENAFIL 100 MG/1
100 TABLET, FILM COATED ORAL DAILY PRN
Qty: 15 TABLET | Refills: 0 | Status: SHIPPED | OUTPATIENT
Start: 2021-11-08 | End: 2021-12-07 | Stop reason: SDUPTHER

## 2021-11-08 NOTE — TELEPHONE ENCOUNTER
Rx Refill Note  Requested Prescriptions      No prescriptions requested or ordered in this encounter      Last office visit with prescribing clinician: 2/4/2021      Next office visit with prescribing clinician: Visit date not found            Luna Daugherty MA  11/08/21, 15:16 EST

## 2021-11-08 NOTE — TELEPHONE ENCOUNTER
Called and informed pt that refill he requested has been ordered and sent to pt's pharmacy.voiced understanding.

## 2021-11-08 NOTE — TELEPHONE ENCOUNTER
----- Message from Clarisse Moore sent at 11/8/2021 10:01 AM EST -----  Regarding: Safe to receive covid booster?   Hi Dr. Damon,  Is it okay for me to get the Covid booster?  I was not on any medications when I received the initial vaccine at the end of March.  Thanks!  Clarisse Moore   Sildenafil 100 mg.  PT needs this med refilled  PT # and pharmacy confirmed.  PT only has two left

## 2021-12-07 DIAGNOSIS — N52.9 ERECTILE DYSFUNCTION, UNSPECIFIED ERECTILE DYSFUNCTION TYPE: ICD-10-CM

## 2021-12-07 RX ORDER — SILDENAFIL 100 MG/1
100 TABLET, FILM COATED ORAL DAILY PRN
Qty: 15 TABLET | Refills: 0 | OUTPATIENT
Start: 2021-12-07

## 2021-12-07 RX ORDER — SILDENAFIL 100 MG/1
100 TABLET, FILM COATED ORAL DAILY PRN
Qty: 15 TABLET | Refills: 0 | Status: SHIPPED | OUTPATIENT
Start: 2021-12-07 | End: 2021-12-09 | Stop reason: SDUPTHER

## 2021-12-07 NOTE — TELEPHONE ENCOUNTER
Rx Refill Note  Requested Prescriptions     Pending Prescriptions Disp Refills   • sildenafil (Viagra) 100 MG tablet 15 tablet 0     Sig: Take 1 tablet by mouth Daily As Needed for Erectile Dysfunction.      Last office visit with prescribing clinician: 2/4/2021      Next office visit with prescribing clinician: 12/9/2021            Rebecca Schwartz MA  12/07/21, 13:41 EST

## 2021-12-07 NOTE — TELEPHONE ENCOUNTER
3}    Medication requested (name and dose): Sildenafil 100 mg    Pharmacy where request should be sent: [facility]    Additional details provided by patient: out of meds    Best call back number: [phone]    Does the patient have less than a 3 day supply:  [x] Yes  [] No    De RODRIGUEZ Rep  12/07/21, 12:20 EST

## 2021-12-07 NOTE — TELEPHONE ENCOUNTER
Rx Refill Note  Requested Prescriptions     Pending Prescriptions Disp Refills   • sildenafil (Viagra) 100 MG tablet 15 tablet 0     Sig: Take 1 tablet by mouth Daily As Needed for Erectile Dysfunction.      Last office visit with prescribing clinician: 2/4/2021      Next office visit with prescribing clinician: Visit date not found            Rebecca Schwartz MA  12/07/21, 12:52 EST     TRIED CALLING PATIENT TO SCHEDULE FOLLOW UP APPOINTMENT NO ANSWER VM NOT SET UP

## 2021-12-09 ENCOUNTER — OFFICE VISIT (OUTPATIENT)
Dept: FAMILY MEDICINE CLINIC | Facility: CLINIC | Age: 61
End: 2021-12-09

## 2021-12-09 VITALS
BODY MASS INDEX: 24.79 KG/M2 | HEIGHT: 75 IN | SYSTOLIC BLOOD PRESSURE: 110 MMHG | OXYGEN SATURATION: 93 % | RESPIRATION RATE: 16 BRPM | WEIGHT: 199.4 LBS | DIASTOLIC BLOOD PRESSURE: 78 MMHG | HEART RATE: 80 BPM

## 2021-12-09 DIAGNOSIS — R20.0 NUMBNESS OF LEFT HAND: ICD-10-CM

## 2021-12-09 DIAGNOSIS — E78.5 HYPERLIPIDEMIA, UNSPECIFIED HYPERLIPIDEMIA TYPE: ICD-10-CM

## 2021-12-09 DIAGNOSIS — N52.9 ERECTILE DYSFUNCTION, UNSPECIFIED ERECTILE DYSFUNCTION TYPE: ICD-10-CM

## 2021-12-09 DIAGNOSIS — M79.602 LEFT ARM PAIN: ICD-10-CM

## 2021-12-09 DIAGNOSIS — R73.03 PREDIABETES: Primary | ICD-10-CM

## 2021-12-09 LAB
EXPIRATION DATE: NORMAL
HBA1C MFR BLD: 5.8 %
Lab: NORMAL

## 2021-12-09 PROCEDURE — 83036 HEMOGLOBIN GLYCOSYLATED A1C: CPT | Performed by: FAMILY MEDICINE

## 2021-12-09 PROCEDURE — 99214 OFFICE O/P EST MOD 30 MIN: CPT | Performed by: FAMILY MEDICINE

## 2021-12-09 RX ORDER — MELOXICAM 15 MG/1
15 TABLET ORAL DAILY
Qty: 30 TABLET | Refills: 2 | Status: SHIPPED | OUTPATIENT
Start: 2021-12-09 | End: 2023-03-28

## 2021-12-09 RX ORDER — SILDENAFIL 100 MG/1
100 TABLET, FILM COATED ORAL DAILY PRN
Qty: 15 TABLET | Refills: 2 | Status: SHIPPED | OUTPATIENT
Start: 2021-12-09 | End: 2022-01-25

## 2021-12-09 RX ORDER — ROSUVASTATIN CALCIUM 5 MG/1
5 TABLET, COATED ORAL DAILY
Qty: 90 TABLET | Refills: 3 | Status: SHIPPED | OUTPATIENT
Start: 2021-12-09 | End: 2023-03-28 | Stop reason: SDDI

## 2021-12-09 NOTE — PATIENT INSTRUCTIONS
It is important that you quit smoking      For more information:    Quit Now [facility]  1-800-QUIT-NOW  https://[email or web address] [email or web address]/en-[place]/  Steps to Quit Smoking  Smoking tobacco can be harmful to your health and can affect almost every organ in your body. Smoking puts you, and those around you, at risk for developing many serious chronic diseases. Quitting smoking is difficult, but it is one of the best things that you can do for your health. It is never too late to quit.  What are the benefits of quitting smoking?  When you quit smoking, you lower your risk of developing serious diseases and conditions, such as:  · Lung cancer or lung disease, such as COPD.  · Heart disease.  · Stroke.  · Heart attack.  · Infertility.  · Osteoporosis and bone fractures.  Additionally, symptoms such as coughing, wheezing, and shortness of breath may get better when you quit. You may also find that you get sick less often because your body is stronger at fighting off colds and infections. If you are pregnant, quitting smoking can help to reduce your chances of having a baby of low birth weight.  How do I get ready to quit?  When you decide to quit smoking, create a plan to make sure that you are successful. Before you quit:  · Pick a date to quit. Set a date within the next two weeks to give you time to prepare.  · Write down the reasons why you are quitting. Keep this list in places where you will see it often, such as on your bathroom mirror or in your car or wallet.  · Identify the people, places, things, and activities that make you want to smoke (triggers) and avoid them. Make sure to take these actions:  ¨ Throw away all cigarettes at home, at work, and in your car.  ¨ Throw away smoking accessories, such as ashtrays and lighters.  ¨ Clean your car and make sure to empty the ashtray.  ¨ Clean your home, including curtains and carpets.  · Tell your family, friends, and coworkers that you are quitting. Support from your loved  ones can make quitting easier.  · Talk with your health care provider about your options for quitting smoking.  · Find out what treatment options are covered by your health insurance.  What strategies can I use to quit smoking?  Talk with your healthcare provider about different strategies to quit smoking. Some strategies include:  · Quitting smoking altogether instead of gradually lessening how much you smoke over a period of time. Research shows that quitting “cold turkey” is more successful than gradually quitting.  · Attending in-person counseling to help you build problem-solving skills. You are more likely to have success in quitting if you attend several counseling sessions. Even short sessions of 10 minutes can be effective.  · Finding resources and support systems that can help you to quit smoking and remain smoke-free after you quit. These resources are most helpful when you use them often. They can include:  ¨ Online chats with a counselor.  ¨ Telephone quitlines.  ¨ Printed self-help materials.  ¨ Support groups or group counseling.  ¨ Text messaging programs.  ¨ Mobile phone applications.  · Taking medicines to help you quit smoking. (If you are pregnant or breastfeeding, talk with your health care provider first.) Some medicines contain nicotine and some do not. Both types of medicines help with cravings, but the medicines that include nicotine help to relieve withdrawal symptoms. Your health care provider may recommend:  ¨ Nicotine patches, gum, or lozenges.  ¨ Nicotine inhalers or sprays.  ¨ Non-nicotine medicine that is taken by mouth.  Talk with your health care provider about combining strategies, such as taking medicines while you are also receiving in-person counseling. Using these two strategies together makes you more likely to succeed in quitting than if you used either strategy on its own.  If you are pregnant or breastfeeding, talk with your health care provider about finding counseling or  other support strategies to quit smoking. Do not take medicine to help you quit smoking unless told to do so by your health care provider.  What things can I do to make it easier to quit?  Quitting smoking might feel overwhelming at first, but there is a lot that you can do to make it easier. Take these important actions:  · Reach out to your family and friends and ask that they support and encourage you during this time. Call telephone quitlines, reach out to support groups, or work with a counselor for support.  · Ask people who smoke to avoid smoking around you.  · Avoid places that trigger you to smoke, such as bars, parties, or smoke-break areas at work.  · Spend time around people who do not smoke.  · Lessen stress in your life, because stress can be a smoking trigger for some people. To lessen stress, try:  ¨ Exercising regularly.  ¨ Deep-breathing exercises.  ¨ Yoga.  ¨ Meditating.  ¨ Performing a body scan. This involves closing your eyes, scanning your body from head to toe, and noticing which parts of your body are particularly tense. Purposefully relax the muscles in those areas.  · Download or purchase mobile phone or tablet apps (applications) that can help you stick to your quit plan by providing reminders, tips, and encouragement. There are many free apps, such as QuitGuide from the CDC (Centers for Disease Control and Prevention). You can find other support for quitting smoking (smoking cessation) through smokefree.gov and other websites.  How will I feel when I quit smoking?  Within the first 24 hours of quitting smoking, you may start to feel some withdrawal symptoms. These symptoms are usually most noticeable 2-3 days after quitting, but they usually do not last beyond 2-3 weeks. Changes or symptoms that you might experience include:  · Mood swings.  · Restlessness, anxiety, or irritation.  · Difficulty concentrating.  · Dizziness.  · Strong cravings for sugary foods in addition to  nicotine.  · Mild weight gain.  · Constipation.  · Nausea.  · Coughing or a sore throat.  · Changes in how your medicines work in your body.  · A depressed mood.  · Difficulty sleeping (insomnia).  After the first 2-3 weeks of quitting, you may start to notice more positive results, such as:  · Improved sense of smell and taste.  · Decreased coughing and sore throat.  · Slower heart rate.  · Lower blood pressure.  · Clearer skin.  · The ability to breathe more easily.  · Fewer sick days.  Quitting smoking is very challenging for most people. Do not get discouraged if you are not successful the first time. Some people need to make many attempts to quit before they achieve long-term success. Do your best to stick to your quit plan, and talk with your health care provider if you have any questions or concerns.  This information is not intended to replace advice given to you by your health care provider. Make sure you discuss any questions you have with your health care provider.  Document Released: 12/12/2002 Document Revised: 08/15/2017 Document Reviewed: 05/03/2016  [employer] Interactive Patient Education © 2017 [employer]      [address]  [place], [place] [id number] [place] [address]   [phone]

## 2021-12-10 NOTE — PROGRESS NOTES
Subjective   Phillip Fulton is a 61 y.o. male.     Chief Complaint   Patient presents with   • Hyperlipidemia       History of Present Illness     Phillip Fulton presents today for   Chief Complaint   Patient presents with   • Hyperlipidemia     The patient has consented to being recorded using JAMES.     The patient is here today to follow up on prediabetes and for medication refill for hyperlipidemia and erectile dysfunction. His last A1c in 01/2021 had improved from almost 6.0 down to 5.7 percent. In 01/2021, he was referred by Dr. Banuelos for erectile dysfunction. The patient had appointments scheduled in 03/2021, 06/2021, 10/2021, and 11/2021, and canceled these.    He reports that he is scheduled to see Dr. Banuelos on 01/10/2021. He states that the prescription is working well for him.    This patient is accompanied by their self who contributes to the history of their care.    The following portions of the patient's history were reviewed and updated as appropriate: allergies, current medications, past family history, past medical history, past social history, past surgical history and problem list.    Active Ambulatory Problems     Diagnosis Date Noted   • Hyperlipidemia 09/23/2019   • Prediabetes 09/23/2019     Resolved Ambulatory Problems     Diagnosis Date Noted   • No Resolved Ambulatory Problems     No Additional Past Medical History     Past Surgical History:   Procedure Laterality Date   • COLONOSCOPY       Family History   Problem Relation Age of Onset   • Hypertension Mother    • Cancer Father    • Cancer Maternal Grandmother    • Cancer Paternal Grandmother      Social History     Socioeconomic History   • Marital status: Single   Tobacco Use   • Smoking status: Current Every Day Smoker     Packs/day: 0.25     Years: 15.00     Pack years: 3.75     Types: Cigars   • Smokeless tobacco: Never Used   Vaping Use   • Vaping Use: Never used   Substance and Sexual Activity   • Alcohol use: Yes     Comment: 4-5  "drinks weekly    • Drug use: Yes     Types: Cocaine(coke)   • Sexual activity: Defer       Review of Systems  See HPI    Objective   Blood pressure 110/78, pulse 80, resp. rate 16, height 190 cm (74.8\"), weight 90.4 kg (199 lb 6.4 oz), SpO2 93 %.  Nursing note reviewed  Physical Exam  Const: NAD, A&Ox4, Pleasant, Cooperative  Eyes: EOMI, no conjunctivitis  ENT: No nasal discharge present, neck supple  Cardiac: Regular rate and rhythm, no cyanosis  Resp: Respiratory rate within normal limits, no increased work of breathing, no audible wheezing or retractions noted  GI: No distention or ascites  MSK: Motor and sensation grossly intact in bilateral upper extremities  Neurologic: CN II-XII grossly intact  Psych: Appropriate mood and behavior.  Skin: Warm, dry  Procedures  Assessment/Plan   Problem List Items Addressed This Visit        Cardiac and Vasculature    Hyperlipidemia    Overview     He is prescribed rosuvastatin 5 mg daily, but does not take this regularly         Relevant Medications    rosuvastatin (CRESTOR) 5 MG tablet       Endocrine and Metabolic    Prediabetes - Primary    Overview     A1c today 5.7%, diet controlled         Relevant Orders    POC Glycosylated Hemoglobin (Hb A1C) (Completed)      Other Visit Diagnoses     Erectile dysfunction, unspecified erectile dysfunction type        Relevant Medications    sildenafil (Viagra) 100 MG tablet    Left arm pain        x2 days, stable    Relevant Medications    meloxicam (MOBIC) 15 MG tablet    Numbness of left hand        Thumb and forefinger x2 days    Relevant Medications    meloxicam (MOBIC) 15 MG tablet          1. Hyperlipidemia  - Chronic    2. Erectile dysfunction  - The patient is following with Dr. Banuelos.    Patient Instructions   It is important that you quit smoking      For more information:    Quit Now [place]  1-800-QUIT-NOW  https://[email or web address].quitlogix.org/en-US/  Steps to Quit Smoking  Smoking tobacco can be harmful to your health and can " affect almost every organ in your body. Smoking puts you, and those around you, at risk for developing many serious chronic diseases. Quitting smoking is difficult, but it is one of the best things that you can do for your health. It is never too late to quit.  What are the benefits of quitting smoking?  When you quit smoking, you lower your risk of developing serious diseases and conditions, such as:  · Lung cancer or lung disease, such as COPD.  · Heart disease.  · Stroke.  · Heart attack.  · Infertility.  · Osteoporosis and bone fractures.  Additionally, symptoms such as coughing, wheezing, and shortness of breath may get better when you quit. You may also find that you get sick less often because your body is stronger at fighting off colds and infections. If you are pregnant, quitting smoking can help to reduce your chances of having a baby of low birth weight.  How do I get ready to quit?  When you decide to quit smoking, create a plan to make sure that you are successful. Before you quit:  · Pick a date to quit. Set a date within the next two weeks to give you time to prepare.  · Write down the reasons why you are quitting. Keep this list in places where you will see it often, such as on your bathroom mirror or in your car or wallet.  · Identify the people, places, things, and activities that make you want to smoke (triggers) and avoid them. Make sure to take these actions:  ¨ Throw away all cigarettes at home, at work, and in your car.  ¨ Throw away smoking accessories, such as ashtrays and lighters.  ¨ Clean your car and make sure to empty the ashtray.  ¨ Clean your home, including curtains and carpets.  · Tell your family, friends, and coworkers that you are quitting. Support from your loved ones can make quitting easier.  · Talk with your health care provider about your options for quitting smoking.  · Find out what treatment options are covered by your health insurance.  What strategies can I use to quit  smoking?  Talk with your healthcare provider about different strategies to quit smoking. Some strategies include:  · Quitting smoking altogether instead of gradually lessening how much you smoke over a period of time. Research shows that quitting “cold turkey” is more successful than gradually quitting.  · Attending in-person counseling to help you build problem-solving skills. You are more likely to have success in quitting if you attend several counseling sessions. Even short sessions of 10 minutes can be effective.  · Finding resources and support systems that can help you to quit smoking and remain smoke-free after you quit. These resources are most helpful when you use them often. They can include:  ¨ Online chats with a counselor.  ¨ Telephone quitlines.  ¨ Printed self-help materials.  ¨ Support groups or group counseling.  ¨ Text messaging programs.  ¨ Mobile phone applications.  · Taking medicines to help you quit smoking. (If you are pregnant or breastfeeding, talk with your health care provider first.) Some medicines contain nicotine and some do not. Both types of medicines help with cravings, but the medicines that include nicotine help to relieve withdrawal symptoms. Your health care provider may recommend:  ¨ Nicotine patches, gum, or lozenges.  ¨ Nicotine inhalers or sprays.  ¨ Non-nicotine medicine that is taken by mouth.  Talk with your health care provider about combining strategies, such as taking medicines while you are also receiving in-person counseling. Using these two strategies together makes you more likely to succeed in quitting than if you used either strategy on its own.  If you are pregnant or breastfeeding, talk with your health care provider about finding counseling or other support strategies to quit smoking. Do not take medicine to help you quit smoking unless told to do so by your health care provider.  What things can I do to make it easier to quit?  Quitting smoking might feel  overwhelming at first, but there is a lot that you can do to make it easier. Take these important actions:  · Reach out to your family and friends and ask that they support and encourage you during this time. Call telephone quitlines, reach out to support groups, or work with a counselor for support.  · Ask people who smoke to avoid smoking around you.  · Avoid places that trigger you to smoke, such as bars, parties, or smoke-break areas at work.  · Spend time around people who do not smoke.  · Lessen stress in your life, because stress can be a smoking trigger for some people. To lessen stress, try:  ¨ Exercising regularly.  ¨ Deep-breathing exercises.  ¨ Yoga.  ¨ Meditating.  ¨ Performing a body scan. This involves closing your eyes, scanning your body from head to toe, and noticing which parts of your body are particularly tense. Purposefully relax the muscles in those areas.  · Download or purchase mobile phone or tablet apps (applications) that can help you stick to your quit plan by providing reminders, tips, and encouragement. There are many free apps, such as QuitGuide from the CDC (Centers for Disease Control and Prevention). You can find other support for quitting smoking (smoking cessation) through smokefree.gov and other websites.  How will I feel when I quit smoking?  Within the first 24 hours of quitting smoking, you may start to feel some withdrawal symptoms. These symptoms are usually most noticeable 2-3 days after quitting, but they usually do not last beyond 2-3 weeks. Changes or symptoms that you might experience include:  · Mood swings.  · Restlessness, anxiety, or irritation.  · Difficulty concentrating.  · Dizziness.  · Strong cravings for sugary foods in addition to nicotine.  · Mild weight gain.  · Constipation.  · Nausea.  · Coughing or a sore throat.  · Changes in how your medicines work in your body.  · A depressed mood.  · Difficulty sleeping (insomnia).  After the first 2-3 weeks of  quitting, you may start to notice more positive results, such as:  · Improved sense of smell and taste.  · Decreased coughing and sore throat.  · Slower heart rate.  · Lower blood pressure.  · Clearer skin.  · The ability to breathe more easily.  · Fewer sick days.  Quitting smoking is very challenging for most people. Do not get discouraged if you are not successful the first time. Some people need to make many attempts to quit before they achieve long-term success. Do your best to stick to your quit plan, and talk with your health care provider if you have any questions or concerns.  This information is not intended to replace advice given to you by your health care provider. Make sure you discuss any questions you have with your health care provider.  Document Released: 12/12/2002 Document Revised: 08/15/2017 Document Reviewed: 05/03/2016  [employer] Interactive Patient Education © 2017 [employer]      [address]  [address]   [phone]      Return in about 6 months (around 6/9/2022) for with A1c;.      [facility]     Ambulatory progress note signed and attested to by Raji Hernandez D.O.         Transcribed from ambient dictation for Raji Hernandez DO by Nadiya Jacobson.  12/09/21   19:16 EST    Patient verbalized consent to the visit recording.  I have personally performed the services described in this document as transcribed by the above individual, and it is both accurate and complete.  Raji Hernandez DO  12/16/2021  15:04 EST

## 2022-01-10 ENCOUNTER — OFFICE VISIT (OUTPATIENT)
Dept: UROLOGY | Age: 62
End: 2022-01-10

## 2022-01-10 VITALS
HEART RATE: 75 BPM | HEIGHT: 75 IN | TEMPERATURE: 96.4 F | OXYGEN SATURATION: 95 % | DIASTOLIC BLOOD PRESSURE: 86 MMHG | SYSTOLIC BLOOD PRESSURE: 132 MMHG | WEIGHT: 199 LBS | BODY MASS INDEX: 24.74 KG/M2

## 2022-01-10 DIAGNOSIS — N52.9 ERECTILE DYSFUNCTION, UNSPECIFIED ERECTILE DYSFUNCTION TYPE: Primary | ICD-10-CM

## 2022-01-10 DIAGNOSIS — R39.9 LOWER URINARY TRACT SYMPTOMS (LUTS): ICD-10-CM

## 2022-01-10 DIAGNOSIS — K42.9 UMBILICAL HERNIA WITHOUT OBSTRUCTION AND WITHOUT GANGRENE: ICD-10-CM

## 2022-01-10 PROCEDURE — 99204 OFFICE O/P NEW MOD 45 MIN: CPT | Performed by: UROLOGY

## 2022-01-10 RX ORDER — TADALAFIL 5 MG/1
5 TABLET ORAL DAILY
Qty: 30 TABLET | Refills: 10 | Status: SHIPPED | OUTPATIENT
Start: 2022-01-10 | End: 2022-11-28

## 2022-01-10 NOTE — PROGRESS NOTES
"Chief Complaint  Erectile dysfunction    Referring Provider  Raji Hernandez, DO    HPI  Mr. Fulton is a 61 y.o. male with history of HLD who presents with erectile dysfunction.    He primarily has difficulty with achieving and maintaining an erection.    He has tried sildenafil in the past with unsatisfactory results.    With viagra, he rates his erectile rigidity as <5 on a scale of 0-10 (with 6 being just firm enough for penetration).     He does not take the oral PDE5- on an empty stomach, 1 hour prior to intercourse and without alcohol. He does not take oral nitrates for chest pain.      Past Medical History  Past Medical History:   Diagnosis Date   • Hyperlipidemia        Past Surgical History  Past Surgical History:   Procedure Laterality Date   • COLONOSCOPY         Medications  He has a current medication list which includes the following prescription(s): meloxicam, rosuvastatin, and sildenafil.    Allergies  No Known Allergies    Social History  He  reports that he has been smoking cigars. He has a 3.75 pack-year smoking history. He has never used smokeless tobacco. He reports current alcohol use. He reports current drug use. Drug: Cocaine(coke).   He smokes a cigar now daily.    Family History  He has no family history of prostate, bladder or kidney cancer.  There is a strong family history of cardiovascular disease.  He family history includes Cancer in his father, maternal grandmother, and paternal grandmother; Hypertension in his mother.      Physical Exam  /86   Pulse 75   Temp 96.4 °F (35.8 °C)   Ht 190 cm (74.8\")   Wt 90.3 kg (199 lb)   SpO2 95%   BMI 25.00 kg/m²   Body mass index is 25 kg/m².  Constitutional: NAD, WDWN.   HEENT: NCAT. Conjunctivae normal.  MMM.    Cardiovascular: Regular rate.  Pulmonary/Chest: Respirations are even and non-labored bilaterally.  Abdominal: Soft. No distension, tenderness; positive umbilical hernia at the end of his prior midline incision, which he " lizet is from childhood surgery.  Neurological: A + O x 3.  Cranial Nerves II-XII grossly intact. Normal gait.  Extremities: GUANAKITO x 4, Warm. No clubbing.  No cyanosis.    Skin: Pink, warm and dry.  No rashes noted.    Genitourinary  Penis: circumcised penis, glans normal, no penile discharge.  No rashes/lesions.  Good stretch length.  Testes: Left: 30 mL, Right: 30mL, no masses and normal consistency, nontender to palpation. Vasa palpable bilaterally.  No clinically palpable varicocele with valsalva. Remainder of scrotal contents normal      Labs  Testosterone, Total (ng/dL)   Date Value   02/03/2021 374.00   08/28/2019 523.00     Prolactin (ng/mL)   Date Value   02/03/2021 23.60 (H)     Hematocrit (%)   Date Value   02/03/2021 42.5   08/28/2019 44.5       Lab Results   Component Value Date    PSA 3.610 02/03/2021    PSA 1.890 08/28/2019       Assessment  Mr. Fulton is a 61 y.o. male with erectile dysfunction.  He also has mild lower urinary tract symptoms, notably intermittent weak stream and urgency.  Risk factors include HLD, smoking, cocaine use.  He also has umbilical hernia on abdominal exam today.    Plan  1.   I discussed treatment options including oral PDE5- medication, intra-urethral suppositories, pharmacologic injections, vacuum erection devices and implantable penile prostheses.    2.  Of the options listed, the patient would like to try a different medication prior to considering other options.  The patient was educated about Cialis.  He was counseled not to use this medication with nitrates. He was instructed to take the medication daily.  Side effects were explained to the patient such as headache, visual changes, upset stomach, and back pain. Lastly, he was instructed to go immediately to his nearest emergency room in the event he developed an erection lasting longer than 3 hours. He voiced understanding of all these instructions and the importance of seeking prompt medical attention for an erection  lasting longer than 3 hours.     3. We also discussed association of erectile dysfunction and future coronary events. Erectile dysfunction can be a marker for future coronary events and usually precedes by 2-4 years.  Recommended he discuss EKG /cardiac evaluation with his PCP and following his cholesterol.  Recommended smoking cessation as well.    4. FU 2 weeks to discuss if daily Cialis efficacious and discussed CT scan results.

## 2022-01-28 ENCOUNTER — APPOINTMENT (OUTPATIENT)
Dept: CT IMAGING | Facility: HOSPITAL | Age: 62
End: 2022-01-28

## 2022-02-16 DIAGNOSIS — N52.9 ERECTILE DYSFUNCTION, UNSPECIFIED ERECTILE DYSFUNCTION TYPE: ICD-10-CM

## 2022-02-16 RX ORDER — SILDENAFIL 100 MG/1
100 TABLET, FILM COATED ORAL DAILY PRN
Qty: 15 TABLET | Refills: 2 | Status: CANCELLED | OUTPATIENT
Start: 2022-02-16

## 2022-02-16 NOTE — TELEPHONE ENCOUNTER
Rx Refill Note  Requested Prescriptions     Pending Prescriptions Disp Refills   • sildenafil (Viagra) 100 MG tablet 15 tablet 2     Sig: Take 1 tablet by mouth Daily As Needed for Erectile Dysfunction.      Last office visit with prescribing clinician: 12/9/2021      Next office visit with prescribing clinician: Visit date not found   3}  Yesika Martínez MA  02/16/22, 14:24 EST     The original prescription was discontinued on 1/25/2022 by Larry Banuelos MD

## 2022-11-21 ENCOUNTER — TELEPHONE (OUTPATIENT)
Dept: UROLOGY | Facility: CLINIC | Age: 62
End: 2022-11-21

## 2022-11-21 NOTE — TELEPHONE ENCOUNTER
Patient calling because he says the cialis isn't working well for him. He wants to know if we can refill the viagra 100mg that he used to take? He says it worked well.  NC

## 2022-11-28 ENCOUNTER — OFFICE VISIT (OUTPATIENT)
Dept: UROLOGY | Facility: CLINIC | Age: 62
End: 2022-11-28

## 2022-11-28 VITALS
TEMPERATURE: 97.7 F | BODY MASS INDEX: 25.12 KG/M2 | HEIGHT: 75 IN | DIASTOLIC BLOOD PRESSURE: 60 MMHG | RESPIRATION RATE: 17 BRPM | OXYGEN SATURATION: 98 % | WEIGHT: 202 LBS | SYSTOLIC BLOOD PRESSURE: 118 MMHG | HEART RATE: 68 BPM

## 2022-11-28 DIAGNOSIS — N52.9 ERECTILE DYSFUNCTION, UNSPECIFIED ERECTILE DYSFUNCTION TYPE: Primary | ICD-10-CM

## 2022-11-28 PROCEDURE — 99214 OFFICE O/P EST MOD 30 MIN: CPT | Performed by: UROLOGY

## 2022-11-28 RX ORDER — SILDENAFIL 100 MG/1
100 TABLET, FILM COATED ORAL AS NEEDED
Qty: 20 TABLET | Refills: 11 | Status: SHIPPED | OUTPATIENT
Start: 2022-11-28

## 2022-11-28 NOTE — PROGRESS NOTES
"No chief complaint on file.       HPI  Ms. Fulton is a 62 y.o. male with history below in assessment, who presents for follow up.     At this visit patient states he would like to go back on namebrand Viagra.  He has told me in the past that he had issues with sildenafil working, but says his lady did not and was satisfied with it.  He states that he is having to take 2-3 of the Cialis daily to get any results.    Past Medical History:   Diagnosis Date   • Hyperlipidemia        Past Surgical History:   Procedure Laterality Date   • COLONOSCOPY           Current Outpatient Medications:   •  chlorhexidine (Periogard) 0.12 % solution, Place 15 milliliters in the mouth by mucous membrane route 2 times per day (after meals), swish in mouth for 30 seconds then spit out, Disp: 473 mL, Rfl: 0  •  HYDROcodone-acetaminophen (NORCO) 5-325 MG per tablet, Take 1 tablet by mouth Every 6 (Six) Hours As Needed for pain, Disp: 5 tablet, Rfl: 0  •  meloxicam (MOBIC) 15 MG tablet, Take 1 tablet by mouth Daily., Disp: 30 tablet, Rfl: 2  •  rosuvastatin (CRESTOR) 5 MG tablet, Take 1 tablet by mouth Daily., Disp: 90 tablet, Rfl: 3     Physical Exam  Visit Vitals  /60 (BP Location: Left arm, Patient Position: Sitting, Cuff Size: Adult)   Pulse 68   Temp 97.7 °F (36.5 °C) (Temporal)   Resp 17   Ht 190.5 cm (75\")   Wt 91.6 kg (202 lb)   SpO2 98%   BMI 25.25 kg/m²       Labs  Brief Urine Lab Results     None          Lab Results   Component Value Date    GLUCOSE 97 02/03/2021    CALCIUM 9.7 02/03/2021     02/03/2021    K 4.1 02/03/2021    CO2 27.8 02/03/2021     02/03/2021    BUN 14 02/03/2021    CREATININE 1.14 02/03/2021    EGFRIFAFRI 80 02/03/2021    BCR 12.3 02/03/2021    ANIONGAP 10.2 02/03/2021       Lab Results   Component Value Date    WBC 8.04 02/03/2021    HGB 14.5 02/03/2021    HCT 42.5 02/03/2021    MCV 92.6 02/03/2021     02/03/2021         Lab Results   Component Value Date    PSA 3.610 02/03/2021    " PSA 1.890 08/28/2019           Radiographic Studies  No Images in the past 120 days found..      I have reviewed above labs and imaging.     Assessment  62 y.o. male with worsening of chronic erectile dysfunction, refractory to oral Cialis.  He has had reportedly mixed results with sildenafil in the past, but he states he would like to go back to namebrand Viagra, and states he thinks this will work better for him.    I did once again reiterate that there were other options other than oral medications, such as intracavernosal injections, vacuum erection device, intraurethral suppositories, as well as penile prosthesis.  He states he is not interested in any of these options.    Plan  1. Patient requests name brand Viagra 100  2.  He will follow-up with me as needed if he changes his mind on any other options.  I am hopeful that Dr. Hernandez can refill Viagra for him in the future if it works for him.

## 2023-03-28 ENCOUNTER — OFFICE VISIT (OUTPATIENT)
Dept: FAMILY MEDICINE CLINIC | Facility: CLINIC | Age: 63
End: 2023-03-28
Payer: COMMERCIAL

## 2023-03-28 VITALS
WEIGHT: 202.8 LBS | OXYGEN SATURATION: 97 % | TEMPERATURE: 98 F | SYSTOLIC BLOOD PRESSURE: 124 MMHG | DIASTOLIC BLOOD PRESSURE: 62 MMHG | BODY MASS INDEX: 25.35 KG/M2 | HEART RATE: 64 BPM

## 2023-03-28 DIAGNOSIS — E78.5 HYPERLIPIDEMIA LDL GOAL <70: ICD-10-CM

## 2023-03-28 DIAGNOSIS — Z12.5 SCREENING PSA (PROSTATE SPECIFIC ANTIGEN): ICD-10-CM

## 2023-03-28 DIAGNOSIS — Z12.11 COLON CANCER SCREENING: Primary | ICD-10-CM

## 2023-03-28 DIAGNOSIS — R73.03 PREDIABETES: ICD-10-CM

## 2023-03-28 RX ORDER — ATORVASTATIN CALCIUM 20 MG/1
20 TABLET, FILM COATED ORAL DAILY
Qty: 90 TABLET | Refills: 1 | Status: SHIPPED | OUTPATIENT
Start: 2023-03-28

## 2023-03-28 NOTE — ASSESSMENT & PLAN NOTE
The 10-year ASCVD risk score (Morelia PICKERING, et al., 2019) is: 13.4%    Values used to calculate the score:      Age: 63 years      Sex: Male      Is Non- : Yes      Diabetic: No      Tobacco smoker: Yes      Systolic Blood Pressure: 124 mmHg      Is BP treated: No      HDL Cholesterol: 52 mg/dL      Total Cholesterol: 198 mg/dL   Recommend at least mod-intensity statin, recheck FLP today, smoking cessation

## 2023-03-28 NOTE — PROGRESS NOTES
"Established Patient Office Visit      Patient Name: Phillip Fulton  : 1960   MRN: [id number]   Care Team: Patient Care Team:  Raji Hernandez DO as PCP - General (Family Medicine)    Chief Complaint:    Chief Complaint   Patient presents with   • Prediabetes     Pt wants to discuss referral for colonoscopy       History of Present Illness: Phillip Fulton is a 63 y.o. male who is here today for chief complaint.    HPI    His last visit was Dec 2021. He would like referreal for colonoscopy, was supposed to return in 5 years after last one (~7 years ago at [facility]) but has not had done again. Stopped his cholesterol-lowering medication (rosuvastatin) about 6 months ago, before that was only taking sporadically anyway. He reports that the rosuvastatin \"messed with his stomach.\"    He still smokes cigars occasionally. He is not interested in assistance for quitting smoking.    This patient is accompanied by their self who contributes to the history of their care.    The following portions of the patient's history were reviewed and updated as appropriate: allergies, current medications, past family history, past medical history, past social history, past surgical history and problem list.    Subjective      Review of Systems:   Review of Systems - See HPI    Past Medical History:   Past Medical History:   Diagnosis Date   • Hyperlipidemia        Past Surgical History:   Past Surgical History:   Procedure Laterality Date   • COLONOSCOPY         Family History:   Family History   Problem Relation Age of Onset   • Hypertension Mother    • Cancer Father    • Cancer Maternal Grandmother    • Cancer Paternal Grandmother        Social History:   Social History     Socioeconomic History   • Marital status: Single   Tobacco Use   • Smoking status: Every Day     Packs/day: 0.25     Years: 15.00     Pack years: 3.75     Types: Cigars, Cigarettes   • Smokeless tobacco: Never   Vaping Use   • Vaping Use: Never used "   Substance and Sexual Activity   • Alcohol use: Yes     Comment: 4-5 drinks weekly    • Drug use: Yes     Types: Cocaine(coke)   • Sexual activity: Defer       Tobacco History:   Social History     Tobacco Use   Smoking Status Every Day   • Packs/day: 0.25   • Years: 15.00   • Pack years: 3.75   • Types: Cigars, Cigarettes   Smokeless Tobacco Never       Medications:     Current Outpatient Medications:   •  HYDROcodone-acetaminophen (NORCO) 5-325 MG per tablet, Take 1 tablet by mouth Every 6 (Six) Hours As Needed for pain, Disp: 5 tablet, Rfl: 0  •  sildenafil (Viagra) 100 MG tablet, Take 1 tablet by mouth As Needed for Erectile Dysfunction., Disp: 20 tablet, Rfl: 11  •  atorvastatin (LIPITOR) 20 MG tablet, Take 1 tablet by mouth Daily., Disp: 90 tablet, Rfl: 1    Allergies:   Allergies   Allergen Reactions   • Rosuvastatin Calcium Nausea Only       Objective   Objective     Physical Exam:  Vital Signs:   Vitals:    03/28/23 0859   BP: 124/62   BP Location: Left arm   Patient Position: Sitting   Cuff Size: Adult   Pulse: 64   Temp: 98 °F (36.7 °C)   TempSrc: Infrared   SpO2: 97%   Weight: 92 kg (202 lb 12.8 oz)     Body mass index is 25.35 kg/m².     Physical Exam  Nursing note reviewed  Const: NAD, A&Ox4, Pleasant, Cooperative  Eyes: EOMI, no conjunctivitis  ENT: No nasal discharge present, neck supple  Cardiac: Regular rate and rhythm, no cyanosis  Resp: Respiratory rate within normal limits, no increased work of breathing, no audible wheezing or retractions noted  GI: No distention or ascites  MSK: Motor and sensation grossly intact in bilateral upper extremities  Neurologic: CN II-XII grossly intact  Psych: Appropriate mood and behavior.  Skin: Warm, dry  Procedures/Radiology     Procedures  No radiology results for the last 7 days     Assessment & Plan   Assessment / Plan      Assessment/Plan:   Problems Addressed This Visit  Diagnoses and all orders for this visit:    1. Colon cancer screening  (Primary)  Comments:  Last ~7 years ago at [facility]  Orders:  -     Ambulatory Referral For Screening Colonoscopy    2. Prediabetes  Assessment & Plan:  Recheck today, has not checked since 2021    Orders:  -     Hemoglobin A1c; Future  -     Comprehensive Metabolic Panel; Future    3. Screening PSA (prostate specific antigen)  -     PSA Screen; Future    4. Hyperlipidemia LDL goal <70  Assessment & Plan:  The 10-year ASCVD risk score (Morelia PICKERING, et al., 2019) is: 13.4%    Values used to calculate the score:      Age: 63 years      Sex: Male      Is Non- : Yes      Diabetic: No      Tobacco smoker: Yes      Systolic Blood Pressure: 124 mmHg      Is BP treated: No      HDL Cholesterol: 52 mg/dL      Total Cholesterol: 198 mg/dL   Recommend at least mod-intensity statin, recheck FLP today, smoking cessation    Orders:  -     Lipid Panel; Future  -     Comprehensive Metabolic Panel; Future  -     atorvastatin (LIPITOR) 20 MG tablet; Take 1 tablet by mouth Daily.  Dispense: 90 tablet; Refill: 1    Problem List Items Addressed This Visit        Cardiac and Vasculature    Hyperlipidemia LDL goal <70    Overview     He is prescribed rosuvastatin 5 mg daily, reports that he stopped taking this about 6 months ago         Current Assessment & Plan     The 10-year ASCVD risk score (Morelia PICKERING, et al., 2019) is: 13.4%    Values used to calculate the score:      Age: 63 years      Sex: Male      Is Non- : Yes      Diabetic: No      Tobacco smoker: Yes      Systolic Blood Pressure: 124 mmHg      Is BP treated: No      HDL Cholesterol: 52 mg/dL      Total Cholesterol: 198 mg/dL   Recommend at least mod-intensity statin, recheck FLP today, smoking cessation         Relevant Medications    atorvastatin (LIPITOR) 20 MG tablet    Other Relevant Orders    Lipid Panel    Comprehensive Metabolic Panel       Endocrine and Metabolic    Prediabetes (Chronic)    Overview     A1c up to 5.7%  previously, diet controlled         Current Assessment & Plan     Recheck today, has not checked since 2021         Relevant Orders    Hemoglobin A1c    Comprehensive Metabolic Panel   Other Visit Diagnoses     Colon cancer screening    -  Primary    Last ~7 years ago at [facility]    Relevant Orders    Ambulatory Referral For Screening Colonoscopy    Screening PSA (prostate specific antigen)        Relevant Orders    PSA Screen        There are no Patient Instructions on file for this visit.    Follow Up:   Return in about 6 months (around 9/28/2023) for Annual, with A1c;.    DO CHANI Avila RD  [facility]  [id number]  [facility] [id number]  Fax [phone]  Phone [phone]

## 2023-04-06 ENCOUNTER — LAB (OUTPATIENT)
Dept: LAB | Facility: HOSPITAL | Age: 63
End: 2023-04-06
Payer: COMMERCIAL

## 2023-04-06 DIAGNOSIS — R73.03 PREDIABETES: ICD-10-CM

## 2023-04-06 DIAGNOSIS — Z12.5 SCREENING PSA (PROSTATE SPECIFIC ANTIGEN): ICD-10-CM

## 2023-04-06 DIAGNOSIS — E78.5 HYPERLIPIDEMIA LDL GOAL <70: ICD-10-CM

## 2023-04-06 LAB
ALBUMIN SERPL-MCNC: 4.1 G/DL (ref 3.5–5.2)
ALBUMIN/GLOB SERPL: 1.5 G/DL
ALP SERPL-CCNC: 70 U/L (ref 39–117)
ALT SERPL W P-5'-P-CCNC: 11 U/L (ref 1–41)
ANION GAP SERPL CALCULATED.3IONS-SCNC: 9 MMOL/L (ref 5–15)
AST SERPL-CCNC: 18 U/L (ref 1–40)
BILIRUB SERPL-MCNC: 0.5 MG/DL (ref 0–1.2)
BUN SERPL-MCNC: 11 MG/DL (ref 8–23)
BUN/CREAT SERPL: 9.3 (ref 7–25)
CALCIUM SPEC-SCNC: 9.1 MG/DL (ref 8.6–10.5)
CHLORIDE SERPL-SCNC: 104 MMOL/L (ref 98–107)
CHOLEST SERPL-MCNC: 247 MG/DL (ref 0–200)
CO2 SERPL-SCNC: 27 MMOL/L (ref 22–29)
CREAT SERPL-MCNC: 1.18 MG/DL (ref 0.76–1.27)
EGFRCR SERPLBLD CKD-EPI 2021: 70.2 ML/MIN/1.73
GLOBULIN UR ELPH-MCNC: 2.8 GM/DL
GLUCOSE SERPL-MCNC: 88 MG/DL (ref 65–99)
HBA1C MFR BLD: 5.3 % (ref 4.8–5.6)
HDLC SERPL-MCNC: 64 MG/DL (ref 40–60)
LDLC SERPL CALC-MCNC: 153 MG/DL (ref 0–100)
LDLC/HDLC SERPL: 2.33 {RATIO}
POTASSIUM SERPL-SCNC: 4.2 MMOL/L (ref 3.5–5.2)
PROT SERPL-MCNC: 6.9 G/DL (ref 6–8.5)
PSA SERPL-MCNC: 3.32 NG/ML (ref 0–4)
SODIUM SERPL-SCNC: 140 MMOL/L (ref 136–145)
TRIGL SERPL-MCNC: 168 MG/DL (ref 0–150)
VLDLC SERPL-MCNC: 30 MG/DL (ref 5–40)

## 2023-04-06 PROCEDURE — 83036 HEMOGLOBIN GLYCOSYLATED A1C: CPT

## 2023-04-06 PROCEDURE — 80053 COMPREHEN METABOLIC PANEL: CPT

## 2023-04-06 PROCEDURE — 80061 LIPID PANEL: CPT

## 2023-04-06 PROCEDURE — G0103 PSA SCREENING: HCPCS

## 2023-11-30 DIAGNOSIS — N52.9 ERECTILE DYSFUNCTION, UNSPECIFIED ERECTILE DYSFUNCTION TYPE: ICD-10-CM

## 2023-11-30 RX ORDER — SILDENAFIL 100 MG/1
100 TABLET, FILM COATED ORAL DAILY PRN
Qty: 20 TABLET | Refills: 0 | Status: SHIPPED | OUTPATIENT
Start: 2023-11-30